# Patient Record
Sex: FEMALE | Race: OTHER | NOT HISPANIC OR LATINO | URBAN - METROPOLITAN AREA
[De-identification: names, ages, dates, MRNs, and addresses within clinical notes are randomized per-mention and may not be internally consistent; named-entity substitution may affect disease eponyms.]

---

## 2017-07-31 ENCOUNTER — EMERGENCY (EMERGENCY)
Facility: HOSPITAL | Age: 22
LOS: 1 days | Discharge: HOME | End: 2017-07-31
Attending: EMERGENCY MEDICINE | Admitting: EMERGENCY MEDICINE
Payer: MEDICAID

## 2017-07-31 VITALS
DIASTOLIC BLOOD PRESSURE: 75 MMHG | RESPIRATION RATE: 18 BRPM | OXYGEN SATURATION: 100 % | HEART RATE: 84 BPM | TEMPERATURE: 99 F | SYSTOLIC BLOOD PRESSURE: 138 MMHG

## 2017-07-31 PROCEDURE — 99284 EMERGENCY DEPT VISIT MOD MDM: CPT

## 2017-07-31 NOTE — ED PROVIDER NOTE - PLAN OF CARE
Please take all medications as indicated. Please follow up with your PMD and an ophthalmologist within x1 week.     Return precautions to the Emergency Department include: unrelenting nausea, vomiting, fever, chills, chest pain, shortness of breath, dizziness, abdominal pain, blood in urine or stool, vision changes, seeing spots, floaters, eye discharge, increasing erythema, eye pain, unrelenting headache.

## 2017-07-31 NOTE — ED PROVIDER NOTE - EYE, RIGHT
CONJUNCTIVA ERYTHEMA/periorbital swelling, small subconjunctival hematoma/TENDERNESS/SWELLING/pupils equal, round, and reactive to light/PHOTOPHOBIA
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
## 2017-07-31 NOTE — ED PROVIDER NOTE - MEDICAL DECISION MAKING DETAILS
Slit lamp exam and fluorescein stain. Slit lamp exam and fluorescein stain, low concern for abrasion or globe injury. Low concern for retinal damage, pt denies vision changes, low concern for retrobulbar hemorrhage, pt well appearing, no proptosis, EOM intact, PEERLa, pain not out of proportion.

## 2017-07-31 NOTE — ED PROVIDER NOTE - CHPI ED SYMPTOMS NEG
no nausea/no syncope/no numbness/no loss of consciousness/no blurred vision/no vomiting/no change in level of consciousness/no chills/no fever/no weakness

## 2017-07-31 NOTE — ED PROVIDER NOTE - CARE PLAN
Principal Discharge DX:	Eye discomfort, right  Instructions for follow-up, activity and diet:	Please take all medications as indicated. Please follow up with your PMD and an ophthalmologist within x1 week.     Return precautions to the Emergency Department include: unrelenting nausea, vomiting, fever, chills, chest pain, shortness of breath, dizziness, abdominal pain, blood in urine or stool, vision changes, seeing spots, floaters, eye discharge, increasing erythema, eye pain, unrelenting headache.
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
## 2017-07-31 NOTE — ED PROVIDER NOTE - ENMT NEGATIVE STATEMENT, MLM
Ears: no ear pain and no hearing problems.Mouth/Throat: no dysphagia, no hoarseness and no throat pain.Neck: no lumps, no pain, no stiffness and no swollen glands.